# Patient Record
Sex: FEMALE | Race: WHITE | URBAN - METROPOLITAN AREA
[De-identification: names, ages, dates, MRNs, and addresses within clinical notes are randomized per-mention and may not be internally consistent; named-entity substitution may affect disease eponyms.]

---

## 2017-07-26 ENCOUNTER — ED HISTORICAL/CONVERTED ENCOUNTER (OUTPATIENT)
Dept: OTHER | Age: 27
End: 2017-07-26

## 2018-12-03 ENCOUNTER — IP HISTORICAL/CONVERTED ENCOUNTER (OUTPATIENT)
Dept: OTHER | Age: 28
End: 2018-12-03

## 2019-01-21 ENCOUNTER — OP HISTORICAL/CONVERTED ENCOUNTER (OUTPATIENT)
Dept: OTHER | Age: 29
End: 2019-01-21

## 2019-03-11 ENCOUNTER — ED HISTORICAL/CONVERTED ENCOUNTER (OUTPATIENT)
Dept: OTHER | Age: 29
End: 2019-03-11

## 2019-03-12 ENCOUNTER — ED HISTORICAL/CONVERTED ENCOUNTER (OUTPATIENT)
Dept: OTHER | Age: 29
End: 2019-03-12

## 2019-03-21 ENCOUNTER — ED HISTORICAL/CONVERTED ENCOUNTER (OUTPATIENT)
Dept: OTHER | Age: 29
End: 2019-03-21

## 2023-08-07 ENCOUNTER — OFFICE VISIT (OUTPATIENT)
Age: 33
End: 2023-08-07
Payer: MEDICAID

## 2023-08-07 VITALS
RESPIRATION RATE: 16 BRPM | DIASTOLIC BLOOD PRESSURE: 75 MMHG | OXYGEN SATURATION: 99 % | BODY MASS INDEX: 23.92 KG/M2 | SYSTOLIC BLOOD PRESSURE: 110 MMHG | HEIGHT: 63 IN | WEIGHT: 135 LBS | HEART RATE: 85 BPM

## 2023-08-07 DIAGNOSIS — R09.81 NASAL CONGESTION: ICD-10-CM

## 2023-08-07 DIAGNOSIS — R40.0 DAYTIME SLEEPINESS: ICD-10-CM

## 2023-08-07 DIAGNOSIS — R43.8 HYPOSMIA: ICD-10-CM

## 2023-08-07 DIAGNOSIS — R06.83 SNORING: Primary | ICD-10-CM

## 2023-08-07 PROCEDURE — 31231 NASAL ENDOSCOPY DX: CPT | Performed by: OTOLARYNGOLOGY

## 2023-08-07 PROCEDURE — 99203 OFFICE O/P NEW LOW 30 MIN: CPT | Performed by: OTOLARYNGOLOGY

## 2023-08-07 RX ORDER — FLUTICASONE PROPIONATE 50 MCG
1 SPRAY, SUSPENSION (ML) NASAL DAILY
Qty: 16 G | Refills: 2 | Status: SHIPPED | OUTPATIENT
Start: 2023-08-07

## 2023-08-07 NOTE — PROGRESS NOTES
Otolaryngology-Head and Neck Surgery  New Patient Visit     Patient: Kris Blanton  YOB: 1990  MRN: [id number]  Date of Service: 8/7/2023    Chief Complaint:   Chief Complaint   Patient presents with    Snoring    Other     Can not smell         History of Present Illness: Kris Blanton is a 28 y.o. female who presents today for discussion of hyposmia and snoring concerns    Has hyposmia/anosmia for the last several years  Unsure of triggers - does not think its related to URI or injury  No prior nasal surgery  In general has allergies but feels breathes ok through her nose    Taste is reduced as well but is able to differentiate between salty, sweet, spicy    Also snores nightly, ongoing for some time  Some concerns for pauses, gasping  Restless sleep quality  + daytime fatigue     Has used intermittent nasal sprays and allergy Rx in the past     Past Medical History:  History reviewed. No pertinent past medical history. Past Surgical History:   Past Surgical History:   Procedure Laterality Date    FOOT SURGERY      KNEE SURGERY      TONSILLECTOMY     Prior T&A    Medications:   No current outpatient medications    Allergies:   Not on File    Social History:   Social History     Tobacco Use    Smoking status: Every Day     Types: Cigarettes    Smokeless tobacco: Never   Substance Use Topics    Alcohol use: Never        Family History:  No family history on file. Review of Systems:    Consitutional: denies fever, excessive weight gain or loss. Eyes: denies diplopia, eye pain. Integumentary: denies new concerning skin lesions. Ears, Nose, Mouth, Throat: denies except as per HPI.   Endocrine: denies hot or cold intolerance, increased thirst.  Respiratory: denies cough, hemoptysis, wheezing  Gastrointestinal: denies trouble swallowing, nausea, emesis, regurgitation  Musculoskeletal: denies muscle weakness or wasting  Cardiovascular: denies chest pain, shortness of breath  Neurologic: denies

## 2024-05-27 ENCOUNTER — HOSPITAL ENCOUNTER (EMERGENCY)
Facility: HOSPITAL | Age: 34
Discharge: HOME | End: 2024-05-27
Attending: STUDENT IN AN ORGANIZED HEALTH CARE EDUCATION/TRAINING PROGRAM
Payer: MEDICAID

## 2024-05-27 VITALS
HEIGHT: 63 IN | HEART RATE: 82 BPM | DIASTOLIC BLOOD PRESSURE: 72 MMHG | RESPIRATION RATE: 16 BRPM | WEIGHT: 140 LBS | OXYGEN SATURATION: 99 % | TEMPERATURE: 98.2 F | SYSTOLIC BLOOD PRESSURE: 105 MMHG | BODY MASS INDEX: 24.8 KG/M2

## 2024-05-27 DIAGNOSIS — H16.041 MARGINAL CORNEAL ULCER, RIGHT EYE: Primary | ICD-10-CM

## 2024-05-27 PROCEDURE — 99283 EMERGENCY DEPT VISIT LOW MDM: CPT

## 2024-05-27 PROCEDURE — 6370000000 HC RX 637 (ALT 250 FOR IP): Performed by: STUDENT IN AN ORGANIZED HEALTH CARE EDUCATION/TRAINING PROGRAM

## 2024-05-27 RX ORDER — KETOROLAC TROMETHAMINE 4 MG/ML
1 SOLUTION/ DROPS OPHTHALMIC 3 TIMES DAILY
Qty: 3 ML | Refills: 0 | Status: SHIPPED | OUTPATIENT
Start: 2024-05-27 | End: 2024-05-27

## 2024-05-27 RX ORDER — ERYTHROMYCIN 5 MG/G
OINTMENT OPHTHALMIC
Qty: 3 G | Refills: 0 | Status: SHIPPED | OUTPATIENT
Start: 2024-05-27 | End: 2024-05-27

## 2024-05-27 RX ORDER — ERYTHROMYCIN 5 MG/G
OINTMENT OPHTHALMIC
Qty: 3 G | Refills: 0 | Status: SHIPPED | OUTPATIENT
Start: 2024-05-27 | End: 2024-06-06

## 2024-05-27 RX ORDER — ERYTHROMYCIN 5 MG/G
OINTMENT OPHTHALMIC
Status: COMPLETED | OUTPATIENT
Start: 2024-05-27 | End: 2024-05-27

## 2024-05-27 RX ORDER — KETOROLAC TROMETHAMINE 4 MG/ML
1 SOLUTION/ DROPS OPHTHALMIC 3 TIMES DAILY
Qty: 3 ML | Refills: 0 | Status: SHIPPED | OUTPATIENT
Start: 2024-05-27 | End: 2024-06-03

## 2024-05-27 RX ADMIN — ERYTHROMYCIN: 5 OINTMENT OPHTHALMIC at 20:18

## 2024-05-27 RX ADMIN — FLUORESCEIN SODIUM 1 MG: 1 STRIP OPHTHALMIC at 19:32

## 2024-05-27 ASSESSMENT — LIFESTYLE VARIABLES
HOW MANY STANDARD DRINKS CONTAINING ALCOHOL DO YOU HAVE ON A TYPICAL DAY: PATIENT DOES NOT DRINK
HOW OFTEN DO YOU HAVE A DRINK CONTAINING ALCOHOL: NEVER

## 2024-05-27 ASSESSMENT — PAIN - FUNCTIONAL ASSESSMENT: PAIN_FUNCTIONAL_ASSESSMENT: 0-10

## 2024-05-27 ASSESSMENT — PAIN SCALES - GENERAL: PAINLEVEL_OUTOF10: 9

## 2024-05-27 NOTE — ED TRIAGE NOTES
Pt stated yesterday she went to the [employer] to dump her trash and during that time she felt something in her right eye. Pt stated today she has used triple clear eye drops without any relief. Pt stated she feels like the film of her eye is moving around and she is very sensitive to light.

## 2024-05-28 NOTE — ED PROVIDER NOTES
[facility] EMERGENCY DEPARTMENT  EMERGENCY DEPARTMENT HISTORY AND PHYSICAL EXAM      Date: 5/27/2024  Patient Name: Annika Carter  MRN: [id number]  Birthdate 1990  Date of evaluation: 5/27/2024  Provider: Hayden Balderas MD   Note Started: 8:18 PM EDT 5/27/24    HISTORY OF PRESENT ILLNESS     Chief Complaint   Patient presents with    Eye Pain       History Provided By: Patient    HPI: Annika Carter is a 33 y.o. female with past medical history as reviewed below presents for evaluation of right eye pain.  Patient states that she was at the dump yesterday and when leaving noted pain in her right eye.  She does not have any specific dust, debris or chemical exposure.  She has not pain in the eye since then.  Also notes photosensitivity.    PAST MEDICAL HISTORY   Past Medical History:  History reviewed. No pertinent past medical history.    Past Surgical History:  Past Surgical History:   Procedure Laterality Date    FOOT SURGERY      KNEE SURGERY      TONSILLECTOMY         Family History:  History reviewed. No pertinent family history.    Social History:  Social History     Tobacco Use    Smoking status: Every Day     Types: Cigarettes    Smokeless tobacco: Never   Substance Use Topics    Alcohol use: Never       Allergies:  Allergies   Allergen Reactions    Sulfa Antibiotics Nausea And Vomiting       PCP: Markel Mathur MD    Current Meds:   Current Facility-Administered Medications   Medication Dose Route Frequency Provider Last Rate Last Admin    erythromycin (ROMYCIN) ophthalmic ointment   Right Eye NOW Hayden Balderas MD         Current Outpatient Medications   Medication Sig Dispense Refill    erythromycin (ROMYCIN) 5 MG/GM ophthalmic ointment Instill ~1cm into right eye 4 times daily for 7 days 3 g 0    ketorolac 0.4 % SOLN ophthalmic solution Place 1 drop into the right eye in the morning, at noon, and at bedtime for 7 days 3 mL 0    fluticasone (FLONASE) 50 MCG/ACT nasal spray 1 spray by

## 2024-11-01 ENCOUNTER — TELEPHONE (OUTPATIENT)
Age: 34
End: 2024-11-01